# Patient Record
Sex: FEMALE | Race: WHITE | Employment: FULL TIME | URBAN - METROPOLITAN AREA
[De-identification: names, ages, dates, MRNs, and addresses within clinical notes are randomized per-mention and may not be internally consistent; named-entity substitution may affect disease eponyms.]

---

## 2022-12-16 ENCOUNTER — APPOINTMENT (OUTPATIENT)
Dept: GENERAL RADIOLOGY | Age: 32
End: 2022-12-16
Payer: COMMERCIAL

## 2022-12-16 ENCOUNTER — HOSPITAL ENCOUNTER (EMERGENCY)
Age: 32
Discharge: HOME | End: 2022-12-16
Payer: COMMERCIAL

## 2022-12-16 VITALS
TEMPERATURE: 98 F | DIASTOLIC BLOOD PRESSURE: 100 MMHG | OXYGEN SATURATION: 98 % | HEART RATE: 88 BPM | RESPIRATION RATE: 20 BRPM | SYSTOLIC BLOOD PRESSURE: 158 MMHG

## 2022-12-16 DIAGNOSIS — U07.1 COVID-19 VIRUS INFECTION: Primary | ICD-10-CM

## 2022-12-16 LAB
BACTERIA: ABNORMAL /HPF
BILIRUBIN URINE: ABNORMAL
BLOOD, URINE: ABNORMAL
CLARITY: CLEAR
COLOR: YELLOW
EPITHELIAL CELLS, UA: ABNORMAL /HPF
GLUCOSE URINE: NEGATIVE MG/DL
HCG(URINE) PREGNANCY TEST: NEGATIVE
INFLUENZA A: NOT DETECTED
INFLUENZA B: NOT DETECTED
KETONES, URINE: ABNORMAL MG/DL
LEUKOCYTE ESTERASE, URINE: NEGATIVE
NITRITE, URINE: NEGATIVE
PH UA: 5 (ref 5–9)
PROTEIN UA: NEGATIVE MG/DL
RBC UA: ABNORMAL /HPF (ref 0–2)
SARS-COV-2 RNA, RT PCR: DETECTED
SPECIFIC GRAVITY UA: >=1.03 (ref 1–1.03)
STREP GRP A PCR: NEGATIVE
UROBILINOGEN, URINE: 0.2 E.U./DL
WBC UA: ABNORMAL /HPF (ref 0–5)

## 2022-12-16 PROCEDURE — 81001 URINALYSIS AUTO W/SCOPE: CPT

## 2022-12-16 PROCEDURE — 70491 CT SOFT TISSUE NECK W/DYE: CPT

## 2022-12-16 PROCEDURE — 87636 SARSCOV2 & INF A&B AMP PRB: CPT

## 2022-12-16 PROCEDURE — 6360000004 HC RX CONTRAST MEDICATION: Performed by: RADIOLOGY

## 2022-12-16 PROCEDURE — 81025 URINE PREGNANCY TEST: CPT

## 2022-12-16 PROCEDURE — 87880 STREP A ASSAY W/OPTIC: CPT

## 2022-12-16 PROCEDURE — 71045 X-RAY EXAM CHEST 1 VIEW: CPT

## 2022-12-16 PROCEDURE — 99285 EMERGENCY DEPT VISIT HI MDM: CPT

## 2022-12-16 RX ADMIN — IOPAMIDOL 50 ML: 755 INJECTION, SOLUTION INTRAVENOUS at 22:35

## 2022-12-16 ASSESSMENT — PAIN DESCRIPTION - ONSET: ONSET: ON-GOING

## 2022-12-16 ASSESSMENT — PAIN DESCRIPTION - DESCRIPTORS: DESCRIPTORS: SORE

## 2022-12-16 ASSESSMENT — PAIN - FUNCTIONAL ASSESSMENT
PAIN_FUNCTIONAL_ASSESSMENT: ACTIVITIES ARE NOT PREVENTED
PAIN_FUNCTIONAL_ASSESSMENT: 0-10

## 2022-12-16 ASSESSMENT — PAIN DESCRIPTION - LOCATION: LOCATION: THROAT

## 2022-12-16 ASSESSMENT — PAIN DESCRIPTION - FREQUENCY: FREQUENCY: CONTINUOUS

## 2022-12-16 ASSESSMENT — LIFESTYLE VARIABLES
HOW OFTEN DO YOU HAVE A DRINK CONTAINING ALCOHOL: NEVER
HOW MANY STANDARD DRINKS CONTAINING ALCOHOL DO YOU HAVE ON A TYPICAL DAY: PATIENT DOES NOT DRINK

## 2022-12-16 ASSESSMENT — PAIN SCALES - GENERAL: PAINLEVEL_OUTOF10: 4

## 2022-12-16 ASSESSMENT — PAIN DESCRIPTION - PAIN TYPE: TYPE: ACUTE PAIN

## 2022-12-16 NOTE — Clinical Note
Osiel Hernandez was seen and treated in our emergency department on 12/16/2022. She may return to work on 12/23/2022. If you have any questions or concerns, please don't hesitate to call.       MIRELA Roe - CNP

## 2022-12-17 NOTE — DISCHARGE INSTRUCTIONS
Need toThere are no signs of epiglottitis noted on your CT scan, chest x-ray is normal.  You have tested positive for COVID-19. You will need to isolate for a minimum of 5 days per the CDC's guidelines or per your employer's policy. Rest, plenty of fluids, Tylenol or ibuprofen as needed for discomfort, you may use over-the-counter cough and cold medication. Should you develop any difficulty breathing, difficulty swallowing, muffled voice please follow-up immediately.

## 2022-12-17 NOTE — SIGNIFICANT EVENT
Radiology Procedure Waiver   Name: Urmila Groves  : 1990  MRN: [id number]    Date:  22    Time: 8:36 PM EST    Benefits of immediately proceeding with Radiology exam(s) without pre-testing outweigh the risks or are not indicated as specified below and therefore the following is/are being waived:    [] Pregnancy test   [] Patients LMP on-time and regular.   [] Patient had Tubal Ligation or has other Contraception Device. [] Patient  is Menopausal or Premenarcheal.    [] Patient had Full or Partial Hysterectomy. [] Protocol for Iodine allergy    [] MRI Questionnaire     [x] BUN/Creatinine   [x] Patient age w/no hx of renal dysfunction. [] Patient on Dialysis. [] Recent Normal Labs.   Electronically signed by MIRELA Arnett CNP on 22 at 8:36 PM EST

## 2022-12-17 NOTE — ED PROVIDER NOTES
[facility]  Department of Emergency Medicine   ED  Encounter Note  Admit Date/RoomTime: 2022  8:10 PM  ED Room:     NAME: Bernie Santacruz  : 1990  MRN: [id number]     Chief Complaint:  Pharyngitis (Sore throat x 2 days, cough with blood.)    History of Present Illness       Bernie Santacruz is a 28 y.o. old female who presents to the emergency department by private vehicle with a 3-day history of nasal congestion, clear rhinorrhea, sore throat, mild cough without fever, chills, body aches. She reports approximately 2 to 3 months ago she did have COVID-19. 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 She was concerned tonight because a nurse at her work was looking in her throat and noticed that her epiglottis was not very prominent. She has not had any difficulty breathing, difficulty swallowing, stridor, muffled voice or voice changes. She reports her symptoms are moderate but cannot identify any exacerbating or remitting factors. She has seen 2 doses of COVID-19 vaccine however has not received a booster or influenza vaccine this year. ROS   Pertinent positives and negatives are stated within HPI, all other systems reviewed and are negative. Past Medical History:  has no past medical history on file. Surgical History:  has no past surgical history on file. Social History:  reports that she has been smoking cigarettes. She has been smoking an average of 1 pack per day. She does not have any smokeless tobacco history on file. She reports that she does not drink alcohol and does not use drugs. Family History: family history is not on file. Allergies: Patient has no known allergies. Physical Exam   Oxygen Saturation Interpretation: Normal on room air analysis.         ED Triage Vitals [22]   BP Temp Temp Source Heart Rate Resp SpO2 Height Weight   (!) 158/100 98 °F (36.7 °C) Oral 88 20 98 % -- --         Constitutional:  Alert, development consistent with age. Ears:  External Ears: Bilateral normal.               TM's & External Canals: normal TM's and external ear canals bilaterally. Nose:   There is clear rhinorrhea. Sinuses: no bilateral maxillary sinus tenderness. no bilateral frontal sinus tenderness. Mouth:  normal tongue and buccal mucosa. Voice normal, speech strong and clear. Throat: mild erythema, tonsillar hypertrophy, 2+, without exudate, there is a prominent noninflamed, nonedematous epiglottis noted with soft palate rise, nonvisible with oropharynx exam.  Airway patent. Neck:  Supple. No meningeal signs. There is no  preauricular and anterior cervical node tenderness. Respiratory:   Breath sounds: bilateral normal.  Lung sounds: normal.  No stridor auscultated over trachea. CV:  Regular rate and rhythm, normal heart sounds, without pathological murmurs, ectopy, gallops, or rubs. GI:  Abdomen Soft, nontender, good bowel sounds. No firm or pulsatile mass. Integument:  Normal turgor. Warm, dry, without visible rash. Neurological:  Oriented. Motor functions intact.        Lab / Imaging Results   (All laboratory and radiology results have been personally reviewed by myself)  Labs:  Results for orders placed or performed during the hospital encounter of 12/16/22   COVID-19 & Influenza Combo    Specimen: Nasopharyngeal Swab   Result Value Ref Range    SARS-CoV-2 RNA, RT PCR DETECTED (A) NOT DETECTED    INFLUENZA A NOT DETECTED NOT DETECTED    INFLUENZA B NOT DETECTED NOT DETECTED   Strep Screen Group A Throat    Specimen: Throat   Result Value Ref Range    Strep Grp A PCR Negative Negative   Urinalysis   Result Value Ref Range    Color, UA Yellow Straw/Yellow    Clarity, UA Clear Clear    Glucose, Ur Negative Negative mg/dL    Bilirubin Urine SMALL (A) Negative    Ketones, Urine TRACE (A) Negative mg/dL    Specific Gravity, UA >=1.030 1.005 - 1.030    Blood, Urine MODERATE (A) Negative    pH, UA 5.0 5.0 - 9.0    Protein, UA Negative Negative mg/dL    Urobilinogen, Urine 0.2 <2.0 E.U./dL    Nitrite, Urine Negative Negative    Leukocyte Esterase, Urine Negative Negative   Pregnancy, Urine   Result Value Ref Range    HCG(Urine) Pregnancy Test NEGATIVE NEGATIVE   Microscopic Urinalysis   Result Value Ref Range    WBC, UA 2-5 0 - 5 /HPF    RBC, UA 5-10 (A) 0 - 2 /HPF    Epithelial Cells, UA MODERATE /HPF    Bacteria, UA MANY (A) None Seen /HPF       Imaging: All Radiology results interpreted by Radiologist unless otherwise noted. CT SOFT TISSUE NECK W CONTRAST   Preliminary Result   Significant enlarged adenoid in the posterior wall of the nasopharynx with a   thickness of 1.8 cm. Otherwise, there is no acute process. Normal epiglottis, aryepiglottic folds and the retropharyngeal soft tissues. No sign of epiglottitis. Normal palatine tonsils bilaterally. Nonspecific multiple centimeter or subcentimeter size lymph nodes in   bilateral submandibular, jugulodigastric and posterior triangle area. XR CHEST PORTABLE   Final Result   No acute process. ED Course / Medical Decision Making     Medications   iopamidol (ISOVUE-370) 76 % injection 50 mL (50 mLs IntraVENous Given 12/16/22 2235)          Medical Decision Making: This is a 80-year-old female who presents with a 3-day history of nasal congestion, clear rhinorrhea, sore throat whose coworker noted that she had a prominent epiglottis. She has not had any stridor, difficulty breathing, difficulty swallowing, changes in voice quality or strength. 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 Exam findings as above, patient is nontoxic in appearance, managing secretions well, no stridor auscultated over trachea.   CT soft tissue neck was completed, there is no evidence of epiglottitis, no retropharyngeal abscess, normal palatine tonsils, multiple nonspecific lymph nodes in bilateral submandibular, jugulodigastric and posterior triangle area found on CT. She did test positive for COVID-19,, negative for influenza. Discussed with patient that a prominent epiglottis without inflammation it can be a normal exam finding in some patients. There is no CT evidence of acute epiglottitis. Patient was advised that outpatient management is appropriate for her current COVID-19 virus infection as her vitals are stable, she is oxygenating well, has no shortness of breath or chest pain. Patient is agreeable and comfortable to discharge home for self-care. Patient was explicitly instructed on specific signs and symptoms on which to return to the emergency room for. Patient was instructed to return to the ER for any new or worsening symptoms. Additional discharge instructions were given verbally. All questions were answered. Patient is comfortable and agreeable with discharge plan. Patient in no acute distress and non-toxic in appearance. Assessment     1. COVID-19 virus infection      Plan   Discharged home. Patient condition is good    New Medications     Discharge Medication List as of 12/16/2022 11:18 PM        Electronically signed by MIRELA Hernández CNP   DD: 12/16/22  **This report was transcribed using voice recognition software. Every effort was made to ensure accuracy; however, inadvertent computerized transcription errors may be present.   END OF ED PROVIDER NOTE        MIRELA Hernández CNP  12/16/22 [id number]

## 2025-06-24 ENCOUNTER — HOSPITAL ENCOUNTER (EMERGENCY)
Age: 35
Discharge: HOME | End: 2025-06-24
Payer: COMMERCIAL

## 2025-06-24 VITALS
DIASTOLIC BLOOD PRESSURE: 88 MMHG | OXYGEN SATURATION: 99 % | WEIGHT: 215 LBS | RESPIRATION RATE: 16 BRPM | HEART RATE: 88 BPM | TEMPERATURE: 97.9 F | BODY MASS INDEX: 38.09 KG/M2 | SYSTOLIC BLOOD PRESSURE: 158 MMHG

## 2025-06-24 DIAGNOSIS — J02.0 STREP PHARYNGITIS: Primary | ICD-10-CM

## 2025-06-24 LAB
HETEROPH AB BLD QL IA: NEGATIVE
SPECIMEN SOURCE: ABNORMAL
STREP A, MOLECULAR: POSITIVE

## 2025-06-24 PROCEDURE — 87651 STREP A DNA AMP PROBE: CPT

## 2025-06-24 PROCEDURE — 99283 EMERGENCY DEPT VISIT LOW MDM: CPT

## 2025-06-24 PROCEDURE — 86308 HETEROPHILE ANTIBODY SCREEN: CPT

## 2025-06-24 ASSESSMENT — PAIN - FUNCTIONAL ASSESSMENT: PAIN_FUNCTIONAL_ASSESSMENT: NONE - DENIES PAIN

## 2025-06-24 NOTE — ED PROVIDER NOTES
[facility]  Department of Emergency Medicine   ED  Encounter Note  Admit Date/RoomTime: 2025 11:51 AM  ED Room:       NAME: Aliya Jones  : 1990  MRN: [id number]     Chief Complaint:  white patch (Pt has painless white spots on throat for 2 days)    History of Present Illness      Aliya Jones is a 35 y.o. old female who presents to the emergency department by private vehicle, for white patches of bilateral sore throat pain, which occured 2 day(s) prior to arrival. Associated Signs & Symptoms: None.  Denies any sore throat, fever, chills, neck pain, nausea vomiting or diarrhea.  Denies possibility of pregnancy.      ROS   Pertinent positives and negatives are stated within HPI, all other systems reviewed and are negative.    Past Medical History:  has no past medical history on file.    Surgical History:  has no past surgical history on file.    Social History:  reports that she has been smoking cigarettes. She has never used smokeless tobacco. She reports that she does not drink alcohol and does not use drugs.    Family History: family history is not on file.     Allergies: Patient has no known allergies.    Physical Exam   Oxygen Saturation Interpretation: Normal.        ED Triage Vitals   BP Systolic BP Percentile Diastolic BP Percentile Temp Temp Source Pulse Respirations SpO2   25 1134 -- -- 25 1134 25 1134 25 1134 25 1134 25 1134   (!) 158/88   97.9 °F (36.6 °C) Oral 88 16 99 %      Height Weight - Scale         -- 25 1154          97.5 kg (215 lb)               Constitutional:  Alert, development consistent with age.  Ears:  normal TM's and external ear canals bilaterally  Throat: Airway Patent. mild erythema, tonsillar hypertrophy, 1+, and exudates present.       Neck/Lymphatic:  Supple. There is no  preauricular, anterior cervical, and posterior cervical node tenderness.  respiratory:  Clear to auscultation and breath